# Patient Record
Sex: FEMALE | Race: OTHER | HISPANIC OR LATINO | URBAN - METROPOLITAN AREA
[De-identification: names, ages, dates, MRNs, and addresses within clinical notes are randomized per-mention and may not be internally consistent; named-entity substitution may affect disease eponyms.]

---

## 2019-08-14 ENCOUNTER — EMERGENCY (EMERGENCY)
Facility: HOSPITAL | Age: 39
LOS: 1 days | Discharge: HOME | End: 2019-08-14
Attending: EMERGENCY MEDICINE
Payer: COMMERCIAL

## 2019-08-14 VITALS
TEMPERATURE: 98 F | RESPIRATION RATE: 18 BRPM | DIASTOLIC BLOOD PRESSURE: 68 MMHG | OXYGEN SATURATION: 99 % | HEART RATE: 73 BPM | SYSTOLIC BLOOD PRESSURE: 112 MMHG

## 2019-08-14 VITALS
TEMPERATURE: 98 F | HEART RATE: 78 BPM | HEIGHT: 62 IN | WEIGHT: 179.9 LBS | DIASTOLIC BLOOD PRESSURE: 71 MMHG | SYSTOLIC BLOOD PRESSURE: 119 MMHG | OXYGEN SATURATION: 99 % | RESPIRATION RATE: 18 BRPM

## 2019-08-14 LAB
ALBUMIN SERPL ELPH-MCNC: 3.8 G/DL — SIGNIFICANT CHANGE UP (ref 3.3–5.2)
ALP SERPL-CCNC: 114 U/L — SIGNIFICANT CHANGE UP (ref 40–120)
ALT FLD-CCNC: 16 U/L — SIGNIFICANT CHANGE UP
ANION GAP SERPL CALC-SCNC: 12 MMOL/L — SIGNIFICANT CHANGE UP (ref 5–17)
AST SERPL-CCNC: 15 U/L — SIGNIFICANT CHANGE UP
BASOPHILS # BLD AUTO: 0.03 K/UL — SIGNIFICANT CHANGE UP (ref 0–0.2)
BASOPHILS NFR BLD AUTO: 0.3 % — SIGNIFICANT CHANGE UP (ref 0–2)
BILIRUB SERPL-MCNC: <0.2 MG/DL — LOW (ref 0.4–2)
BUN SERPL-MCNC: 10 MG/DL — SIGNIFICANT CHANGE UP (ref 8–20)
CALCIUM SERPL-MCNC: 9.2 MG/DL — SIGNIFICANT CHANGE UP (ref 8.6–10.2)
CHLORIDE SERPL-SCNC: 105 MMOL/L — SIGNIFICANT CHANGE UP (ref 98–107)
CO2 SERPL-SCNC: 22 MMOL/L — SIGNIFICANT CHANGE UP (ref 22–29)
CREAT SERPL-MCNC: 0.54 MG/DL — SIGNIFICANT CHANGE UP (ref 0.5–1.3)
EOSINOPHIL # BLD AUTO: 0.22 K/UL — SIGNIFICANT CHANGE UP (ref 0–0.5)
EOSINOPHIL NFR BLD AUTO: 2.5 % — SIGNIFICANT CHANGE UP (ref 0–6)
GLUCOSE SERPL-MCNC: 94 MG/DL — SIGNIFICANT CHANGE UP (ref 70–115)
HCG SERPL-ACNC: <4 MIU/ML — SIGNIFICANT CHANGE UP
HCT VFR BLD CALC: 38.3 % — SIGNIFICANT CHANGE UP (ref 34.5–45)
HGB BLD-MCNC: 12.6 G/DL — SIGNIFICANT CHANGE UP (ref 11.5–15.5)
IMM GRANULOCYTES NFR BLD AUTO: 0.3 % — SIGNIFICANT CHANGE UP (ref 0–1.5)
LYMPHOCYTES # BLD AUTO: 3.12 K/UL — SIGNIFICANT CHANGE UP (ref 1–3.3)
LYMPHOCYTES # BLD AUTO: 35.7 % — SIGNIFICANT CHANGE UP (ref 13–44)
MCHC RBC-ENTMCNC: 30.1 PG — SIGNIFICANT CHANGE UP (ref 27–34)
MCHC RBC-ENTMCNC: 32.9 GM/DL — SIGNIFICANT CHANGE UP (ref 32–36)
MCV RBC AUTO: 91.6 FL — SIGNIFICANT CHANGE UP (ref 80–100)
MONOCYTES # BLD AUTO: 0.86 K/UL — SIGNIFICANT CHANGE UP (ref 0–0.9)
MONOCYTES NFR BLD AUTO: 9.9 % — SIGNIFICANT CHANGE UP (ref 2–14)
NEUTROPHILS # BLD AUTO: 4.47 K/UL — SIGNIFICANT CHANGE UP (ref 1.8–7.4)
NEUTROPHILS NFR BLD AUTO: 51.3 % — SIGNIFICANT CHANGE UP (ref 43–77)
PLATELET # BLD AUTO: 355 K/UL — SIGNIFICANT CHANGE UP (ref 150–400)
POTASSIUM SERPL-MCNC: 4 MMOL/L — SIGNIFICANT CHANGE UP (ref 3.5–5.3)
POTASSIUM SERPL-SCNC: 4 MMOL/L — SIGNIFICANT CHANGE UP (ref 3.5–5.3)
PROT SERPL-MCNC: 7 G/DL — SIGNIFICANT CHANGE UP (ref 6.6–8.7)
RBC # BLD: 4.18 M/UL — SIGNIFICANT CHANGE UP (ref 3.8–5.2)
RBC # FLD: 13.5 % — SIGNIFICANT CHANGE UP (ref 10.3–14.5)
SODIUM SERPL-SCNC: 139 MMOL/L — SIGNIFICANT CHANGE UP (ref 135–145)
TROPONIN T SERPL-MCNC: <0.01 NG/ML — SIGNIFICANT CHANGE UP (ref 0–0.06)
WBC # BLD: 8.73 K/UL — SIGNIFICANT CHANGE UP (ref 3.8–10.5)
WBC # FLD AUTO: 8.73 K/UL — SIGNIFICANT CHANGE UP (ref 3.8–10.5)

## 2019-08-14 PROCEDURE — 93010 ELECTROCARDIOGRAM REPORT: CPT

## 2019-08-14 PROCEDURE — 36415 COLL VENOUS BLD VENIPUNCTURE: CPT

## 2019-08-14 PROCEDURE — 71046 X-RAY EXAM CHEST 2 VIEWS: CPT

## 2019-08-14 PROCEDURE — T1013: CPT

## 2019-08-14 PROCEDURE — 71046 X-RAY EXAM CHEST 2 VIEWS: CPT | Mod: 26

## 2019-08-14 PROCEDURE — 99284 EMERGENCY DEPT VISIT MOD MDM: CPT | Mod: 25

## 2019-08-14 PROCEDURE — 80053 COMPREHEN METABOLIC PANEL: CPT

## 2019-08-14 PROCEDURE — 93005 ELECTROCARDIOGRAM TRACING: CPT

## 2019-08-14 PROCEDURE — 84484 ASSAY OF TROPONIN QUANT: CPT

## 2019-08-14 PROCEDURE — 85027 COMPLETE CBC AUTOMATED: CPT

## 2019-08-14 PROCEDURE — 99285 EMERGENCY DEPT VISIT HI MDM: CPT

## 2019-08-14 PROCEDURE — 84702 CHORIONIC GONADOTROPIN TEST: CPT

## 2019-08-14 PROCEDURE — 96374 THER/PROPH/DIAG INJ IV PUSH: CPT

## 2019-08-14 RX ORDER — KETOROLAC TROMETHAMINE 30 MG/ML
15 SYRINGE (ML) INJECTION ONCE
Refills: 0 | Status: DISCONTINUED | OUTPATIENT
Start: 2019-08-14 | End: 2019-08-14

## 2019-08-14 RX ADMIN — Medication 15 MILLIGRAM(S): at 21:43

## 2019-08-14 NOTE — ED STATDOCS - PROGRESS NOTE DETAILS
Pt seen by intake MD and agree with HPI/ROS/PE/Plan. Will reassess. Pt labs wnl/ EKG nsr. Will dc with PCP f/u.

## 2019-08-14 NOTE — ED ADULT NURSE REASSESSMENT NOTE - NS ED NURSE REASSESS COMMENT FT1
pt hemodynamically stable, PIV removed, refer to flowsheet and chart, pt to be discharged, pt understood discharge instructions and plan of care. Pt medically cleared by MD Delcid.

## 2019-08-14 NOTE — ED STATDOCS - CLINICAL SUMMARY MEDICAL DECISION MAKING FREE TEXT BOX
Pt with L chest wall pain underneath breast. Low suspicion for coronary disease, will get blood work, 1 set Troponin, Torodol for pain, XRay.

## 2019-08-14 NOTE — ED ADULT TRIAGE NOTE - CHIEF COMPLAINT QUOTE
Patient states that she is having a burning pain under her left breast since yesterday and pain in her back. Pt denies any injury to her back

## 2019-08-14 NOTE — ED STATDOCS - NS ED ROS FT
Review of Systems  •	CONSTITUTIONAL - no  fever, no diaphoresis, no weight change  •	SKIN - no rash  •	HEMATOLOGIC - no bleeding, no bruising  •	EYES - no eye pain, no blurred vision  •	ENT - no change in hearing, no pain  •	RESPIRATORY - no shortness of breath, no cough  •	CARDIAC - no chest pain, no palpitations  •	GI - no abd pain, no nausea, no vomiting, no diarrhea, no constipation, no bleeding  •	GENITO-URINARY - no discharge, no dysuria; no hematuria,   •	ENDO - no polydypsia, no polyurea, no heat/no cold intolerance  •	MUSCULOSKELETAL - no joint pain, no swelling, no redness, (+) L breast pain  •	NEUROLOGIC - no weakness, no headache, no anesthesia, no paresthesias  •	PSYCH - no anxiety, non suicidal, non homicidal, no hallucination, no depression

## 2019-08-14 NOTE — ED ADULT NURSE NOTE - CHPI ED NUR SYMPTOMS NEG
no shortness of breath/no back pain/no nausea/no fever/no syncope/no dizziness/no chills/no congestion/no diaphoresis/no vomiting

## 2019-08-14 NOTE — ED STATDOCS - ATTENDING CONTRIBUTION TO CARE
I, Hudson Delcid, performed the initial face to face bedside interview with this patient regarding history of present illness, review of symptoms and relevant past medical, social and family history.  I completed an independent physical examination.  I was the initial provider who evaluated this patient. I have signed out the follow up of any pending tests (i.e. labs, radiological studies) to the ACP.  I have communicated the patient’s plan of care and disposition with the ACP.

## 2019-08-14 NOTE — ED STATDOCS - OBJECTIVE STATEMENT
HPI translated by ED , Bruna.  37 y/o F pt with no significant PMHx presents to ED c/o intermittent L chest wall pain (underneath breast) radiating to back onset yesterday. Pt states that the pain began in her breast yesterday, but it began to radiate to back today. Pain began upon sitting at home, worsened with laying down and inspiration, no recent trauma. Self medicated with 2 Ibuprofen. Hx of questionable liver disease. No fevers, chills, cough. No further acute complaints at this time.

## 2020-06-21 NOTE — ED STATDOCS - PHYSICAL EXAMINATION
S/  Taking po well, voiding well, passing flatus    O/  VSS AF           Abdomen with normal tenderness, incision dry and intact    A/  POD 2 LTCS, stable    P/  Routine care, she wants to go home today, see D/C notes and instructions VITAL SIGNS: I have reviewed nursing notes and confirm.  CONSTITUTIONAL: (+) Obese female; in no acute distress.  SKIN: Skin exam is warm and dry, no acute rash.  HEAD: Normocephalic; atraumatic.  EYES: PERRL, EOM intact; conjunctiva and sclera clear.  ENT: No nasal discharge; airway clear. Throat clear.  NECK: Supple; non tender.    CARD: S1, S2 normal; no murmurs, gallops, or rubs. Regular rate and rhythm.  RESP: No wheezes,  no rales or rhonchi. (+) Chest wall tenderness above L breast  ABD:  soft; non-distended; non-tender;   EXT: Normal ROM. No clubbing, cyanosis or edema. (+) Diffuse upper paraspinal pain  NEURO: Alert, oriented. Grossly unremarkable. No focal deficits. no facial droop, moves all extremities  PSYCH: Cooperative, appropriate.

## 2020-09-24 NOTE — ED ADULT TRIAGE NOTE - WEIGHT IN LBS
Patient presents to clinic for physical exam.  Medication Reconciliation: complete    Melissa Santiago, JOHNN     179.8

## 2023-02-07 ENCOUNTER — EMERGENCY (EMERGENCY)
Facility: HOSPITAL | Age: 43
LOS: 1 days | Discharge: HOME | End: 2023-02-07
Attending: EMERGENCY MEDICINE
Payer: COMMERCIAL

## 2023-02-07 VITALS
HEART RATE: 106 BPM | RESPIRATION RATE: 20 BRPM | OXYGEN SATURATION: 96 % | SYSTOLIC BLOOD PRESSURE: 114 MMHG | DIASTOLIC BLOOD PRESSURE: 65 MMHG | HEIGHT: 59 IN | TEMPERATURE: 100 F | WEIGHT: 188.05 LBS

## 2023-02-07 LAB
ALBUMIN SERPL ELPH-MCNC: 4 G/DL — SIGNIFICANT CHANGE UP (ref 3.3–5.2)
ALP SERPL-CCNC: 128 U/L — HIGH (ref 40–120)
ALT FLD-CCNC: 55 U/L — HIGH
ANION GAP SERPL CALC-SCNC: 12 MMOL/L — SIGNIFICANT CHANGE UP (ref 5–17)
APPEARANCE UR: CLEAR — SIGNIFICANT CHANGE UP
AST SERPL-CCNC: 48 U/L — HIGH
BACTERIA # UR AUTO: ABNORMAL
BASOPHILS # BLD AUTO: 0.02 K/UL — SIGNIFICANT CHANGE UP (ref 0–0.2)
BASOPHILS NFR BLD AUTO: 0.1 % — SIGNIFICANT CHANGE UP (ref 0–2)
BILIRUB SERPL-MCNC: <0.2 MG/DL — LOW (ref 0.4–2)
BILIRUB UR-MCNC: NEGATIVE — SIGNIFICANT CHANGE UP
BUN SERPL-MCNC: 8.1 MG/DL — SIGNIFICANT CHANGE UP (ref 8–20)
CALCIUM SERPL-MCNC: 8.4 MG/DL — SIGNIFICANT CHANGE UP (ref 8.4–10.5)
CHLORIDE SERPL-SCNC: 107 MMOL/L — SIGNIFICANT CHANGE UP (ref 96–108)
CO2 SERPL-SCNC: 21 MMOL/L — LOW (ref 22–29)
COLOR SPEC: YELLOW — SIGNIFICANT CHANGE UP
CREAT SERPL-MCNC: 0.58 MG/DL — SIGNIFICANT CHANGE UP (ref 0.5–1.3)
DIFF PNL FLD: ABNORMAL
EGFR: 117 ML/MIN/1.73M2 — SIGNIFICANT CHANGE UP
EOSINOPHIL # BLD AUTO: 0.06 K/UL — SIGNIFICANT CHANGE UP (ref 0–0.5)
EOSINOPHIL NFR BLD AUTO: 0.4 % — SIGNIFICANT CHANGE UP (ref 0–6)
EPI CELLS # UR: SIGNIFICANT CHANGE UP
GLUCOSE SERPL-MCNC: 94 MG/DL — SIGNIFICANT CHANGE UP (ref 70–99)
GLUCOSE UR QL: NEGATIVE MG/DL — SIGNIFICANT CHANGE UP
HCG SERPL-ACNC: <4 MIU/ML — SIGNIFICANT CHANGE UP
HCT VFR BLD CALC: 39.1 % — SIGNIFICANT CHANGE UP (ref 34.5–45)
HGB BLD-MCNC: 13.3 G/DL — SIGNIFICANT CHANGE UP (ref 11.5–15.5)
IMM GRANULOCYTES NFR BLD AUTO: 0.4 % — SIGNIFICANT CHANGE UP (ref 0–0.9)
KETONES UR-MCNC: NEGATIVE — SIGNIFICANT CHANGE UP
LEUKOCYTE ESTERASE UR-ACNC: NEGATIVE — SIGNIFICANT CHANGE UP
LYMPHOCYTES # BLD AUTO: 1.88 K/UL — SIGNIFICANT CHANGE UP (ref 1–3.3)
LYMPHOCYTES # BLD AUTO: 13.6 % — SIGNIFICANT CHANGE UP (ref 13–44)
MCHC RBC-ENTMCNC: 30.1 PG — SIGNIFICANT CHANGE UP (ref 27–34)
MCHC RBC-ENTMCNC: 34 GM/DL — SIGNIFICANT CHANGE UP (ref 32–36)
MCV RBC AUTO: 88.5 FL — SIGNIFICANT CHANGE UP (ref 80–100)
MONOCYTES # BLD AUTO: 0.92 K/UL — HIGH (ref 0–0.9)
MONOCYTES NFR BLD AUTO: 6.6 % — SIGNIFICANT CHANGE UP (ref 2–14)
NEUTROPHILS # BLD AUTO: 10.93 K/UL — HIGH (ref 1.8–7.4)
NEUTROPHILS NFR BLD AUTO: 78.9 % — HIGH (ref 43–77)
NITRITE UR-MCNC: NEGATIVE — SIGNIFICANT CHANGE UP
PH UR: 5 — SIGNIFICANT CHANGE UP (ref 5–8)
PLATELET # BLD AUTO: 324 K/UL — SIGNIFICANT CHANGE UP (ref 150–400)
POTASSIUM SERPL-MCNC: 3.5 MMOL/L — SIGNIFICANT CHANGE UP (ref 3.5–5.3)
POTASSIUM SERPL-SCNC: 3.5 MMOL/L — SIGNIFICANT CHANGE UP (ref 3.5–5.3)
PROT SERPL-MCNC: 7 G/DL — SIGNIFICANT CHANGE UP (ref 6.6–8.7)
PROT UR-MCNC: 30 MG/DL
RAPID RVP RESULT: SIGNIFICANT CHANGE UP
RBC # BLD: 4.42 M/UL — SIGNIFICANT CHANGE UP (ref 3.8–5.2)
RBC # FLD: 14 % — SIGNIFICANT CHANGE UP (ref 10.3–14.5)
RBC CASTS # UR COMP ASSIST: SIGNIFICANT CHANGE UP /HPF (ref 0–4)
SARS-COV-2 RNA SPEC QL NAA+PROBE: SIGNIFICANT CHANGE UP
SODIUM SERPL-SCNC: 139 MMOL/L — SIGNIFICANT CHANGE UP (ref 135–145)
SP GR SPEC: 1.02 — SIGNIFICANT CHANGE UP (ref 1.01–1.02)
UROBILINOGEN FLD QL: NEGATIVE MG/DL — SIGNIFICANT CHANGE UP
WBC # BLD: 13.86 K/UL — HIGH (ref 3.8–10.5)
WBC # FLD AUTO: 13.86 K/UL — HIGH (ref 3.8–10.5)
WBC UR QL: SIGNIFICANT CHANGE UP /HPF (ref 0–5)

## 2023-02-07 PROCEDURE — 99284 EMERGENCY DEPT VISIT MOD MDM: CPT

## 2023-02-07 PROCEDURE — 99284 EMERGENCY DEPT VISIT MOD MDM: CPT | Mod: 25

## 2023-02-07 PROCEDURE — 85025 COMPLETE CBC W/AUTO DIFF WBC: CPT

## 2023-02-07 PROCEDURE — 83690 ASSAY OF LIPASE: CPT

## 2023-02-07 PROCEDURE — 36415 COLL VENOUS BLD VENIPUNCTURE: CPT

## 2023-02-07 PROCEDURE — 87086 URINE CULTURE/COLONY COUNT: CPT

## 2023-02-07 PROCEDURE — 87040 BLOOD CULTURE FOR BACTERIA: CPT

## 2023-02-07 PROCEDURE — 81001 URINALYSIS AUTO W/SCOPE: CPT

## 2023-02-07 PROCEDURE — 74177 CT ABD & PELVIS W/CONTRAST: CPT | Mod: 26,MA

## 2023-02-07 PROCEDURE — 0225U NFCT DS DNA&RNA 21 SARSCOV2: CPT

## 2023-02-07 PROCEDURE — 84702 CHORIONIC GONADOTROPIN TEST: CPT

## 2023-02-07 PROCEDURE — 80053 COMPREHEN METABOLIC PANEL: CPT

## 2023-02-07 PROCEDURE — 74177 CT ABD & PELVIS W/CONTRAST: CPT | Mod: MA

## 2023-02-07 PROCEDURE — 96360 HYDRATION IV INFUSION INIT: CPT | Mod: XU

## 2023-02-07 RX ORDER — IBUPROFEN 200 MG
600 TABLET ORAL ONCE
Refills: 0 | Status: COMPLETED | OUTPATIENT
Start: 2023-02-07 | End: 2023-02-07

## 2023-02-07 RX ORDER — SODIUM CHLORIDE 9 MG/ML
1000 INJECTION INTRAMUSCULAR; INTRAVENOUS; SUBCUTANEOUS ONCE
Refills: 0 | Status: COMPLETED | OUTPATIENT
Start: 2023-02-07 | End: 2023-02-07

## 2023-02-07 RX ORDER — ACETAMINOPHEN 500 MG
975 TABLET ORAL ONCE
Refills: 0 | Status: COMPLETED | OUTPATIENT
Start: 2023-02-07 | End: 2023-02-07

## 2023-02-07 RX ADMIN — Medication 975 MILLIGRAM(S): at 12:44

## 2023-02-07 RX ADMIN — Medication 600 MILLIGRAM(S): at 12:45

## 2023-02-07 RX ADMIN — SODIUM CHLORIDE 1000 MILLILITER(S): 9 INJECTION INTRAMUSCULAR; INTRAVENOUS; SUBCUTANEOUS at 12:45

## 2023-02-07 NOTE — ED STATDOCS - NSFOLLOWUPINSTRUCTIONS_ED_ALL_ED_FT
Dolor abdominal en los adultos    Abdominal Pain, Adult      El dolor en el abdomen (dolor abdominal) puede tener muchas causas. A menudo, el dolor abdominal no es grave y mejora sin tratamiento o con tratamiento en la casa. Sin embargo, a veces el dolor abdominal es grave.    El médico le hará preguntas sobre magda antecedentes médicos y le hará un examen físico para tratar de determinar la causa del dolor abdominal.      Siga estas instrucciones en barba casa:     Medicamentos     •Use los medicamentos de venta melisa y los recetados solamente zenaida se lo haya indicado el médico.      • No tome un laxante a menos que se lo haya indicado el médico.      Instrucciones generales     •Controle barba afección para detectar cualquier cambio.      •Mesha suficiente líquido zenaida para mantener la orina de color amarillo pálido.      •Concurra a todas las visitas de seguimiento zenaida se lo haya indicado el médico. [place] es importante.        Comuníquese con un médico si:    •El dolor abdominal cambia o empeora.      •No tiene apetito o baja de peso sin proponérselo.      •Está estreñido o tiene diarrea peter más de 2 o 3 días.      •Tiene dolor cuando orina o defeca.      •El dolor abdominal lo despierta de noche.      •El dolor empeora con las comidas, después de comer o con determinados alimentos.      •Tiene vómitos y no puede retener nada de lo que ingiere.      •Tiene fiebre.      •Observa joaquin en la orina.        Solicite ayuda inmediatamente si:    •El dolor no desaparece tan pronto zenaida el médico le dijo que era esperable.      •No puede dejar de vomitar.      •El dolor se siente solo en zonas del abdomen, zenaida el lado derecho o la parte inferior izquierda del abdomen. Si se localiza en la valencia derecha, posiblemente podría tratarse de apendicitis.      •Las heces son sanguinolentas o de color shabana, o de aspecto alquitranado.      •Tiene dolor intenso, cólicos o distensión abdominal.    •Tiene signos de deshidratación, zenaida los siguientes:  •Orina de color oscuro, muy escasa o falta de orina.      •Labios agrietados.      •Sequedad de boca.      •Ojos hundidos.      •Somnolencia.      •Debilidad.        •Tiene dificultad para respirar o dolor en el pecho.        Resumen    •A menudo, el dolor abdominal no es grave y mejora sin tratamiento o con tratamiento en la casa. Sin embargo, a veces el dolor abdominal es grave.      •Controle barba afección para detectar cualquier cambio.      •Use los medicamentos de venta melisa y los recetados solamente zenaida se lo haya indicado el médico.      •Comuníquese con un médico si el dolor abdominal cambia o empeora.      •Busque ayuda de inmediato si tiene dolor intenso, cólicos o distensión abdominal.      Esta información no tiene zenaida fin reemplazar el consejo del médico. Asegúrese de hacerle al médico cualquier pregunta que tenga.

## 2023-02-07 NOTE — ED ADULT NURSE NOTE - OBJECTIVE STATEMENT
PT reports pain since Saturday associated with fevers and diarrhea. Pain now radiates to back and right lower quadrant. Pt took ibuprofen with out relief.

## 2023-02-07 NOTE — ED STATDOCS - NS ED ATTENDING STATEMENT MOD
This was a shared visit with the CONOR. I reviewed and verified the documentation and independently performed the documented:

## 2023-02-07 NOTE — ED STATDOCS - PROGRESS NOTE DETAILS
Shlomo Rondon PA-C: Pt discussed at length with attending HPI/ROS/PE confirmed, Pt seen resting comfortable in no acute distress in chair.

## 2023-02-07 NOTE — ED STATDOCS - PATIENT PORTAL LINK FT
You can access the FollowMyHealth Patient Portal offered by [facility] by registering at the following website: [email or web address]. By joining [employer]’s FollowMyHealth portal, you will also be able to view your health information using other applications (apps) compatible with our system.

## 2023-02-07 NOTE — ED STATDOCS - OBJECTIVE STATEMENT
43 y/o female with no PMHx s/p appendectomy, and  presents to ED c/o abdominal pain. Patient reports 4 days of abdominal pain with associated diarrhea, chills and subjective fever. Reports now the pain is radiating around to the back bilaterally x3 days.     Denies N/V, urinary symptoms  : Krissy

## 2023-02-07 NOTE — ED ADULT NURSE NOTE - CHIEF COMPLAINT QUOTE
Pt A&OX4, NAD. Pt presents to the ED with complaints of abdominal pian and diarrhea x4 days. Breathing even and unlabored.

## 2023-02-07 NOTE — ED STATDOCS - CLINICAL SUMMARY MEDICAL DECISION MAKING FREE TEXT BOX
evaluate for source of fever; Viral swab, labs, UA, and re-assess. evaluate for source of fever; Viral swab, labs, UA, and re-assess.      Shlomo Rondon PA-C: PT with stable VS, no acute distress, non toxic appearing, tolerating PO in the ED, Pt with no acute findings requiring intervention at this time, Pt cleared for dc home with supportive care, follow up to PCP, educated about when to return to the ED if needed. PT verbalizes that he understands all instructions and results. Pt informed that ED is open and available 24/7 365 days a yr, encouraged to return to the ED if they have any change in condition, or feel the need for revaluation.    utilized to obtain History, ROS, Physical Exam, explanations of results and plan of care, as well as follow up instructions.

## 2023-02-07 NOTE — ED STATDOCS - ADDITIONAL NOTES AND INSTRUCTIONS:
PT was evaluated At [facility] ED and was found to have a condition that warranted time of to rest and heal from WORK/SCHOOL.   Shlomo Rondon PA-C

## 2023-02-07 NOTE — ED STATDOCS - ATTENDING APP SHARED VISIT CONTRIBUTION OF CARE
This was a shared visit with CONOR. I reviewed and verified the documentation and independently performed the documented history/exam/mdm.

## 2023-02-08 LAB
CULTURE RESULTS: SIGNIFICANT CHANGE UP
SPECIMEN SOURCE: SIGNIFICANT CHANGE UP

## 2023-02-12 LAB
CULTURE RESULTS: SIGNIFICANT CHANGE UP
CULTURE RESULTS: SIGNIFICANT CHANGE UP
SPECIMEN SOURCE: SIGNIFICANT CHANGE UP
SPECIMEN SOURCE: SIGNIFICANT CHANGE UP

## 2024-08-15 ENCOUNTER — EMERGENCY (EMERGENCY)
Facility: HOSPITAL | Age: 44
LOS: 1 days | Discharge: HOME | End: 2024-08-15
Attending: EMERGENCY MEDICINE
Payer: COMMERCIAL

## 2024-08-15 VITALS
SYSTOLIC BLOOD PRESSURE: 123 MMHG | TEMPERATURE: 99 F | OXYGEN SATURATION: 99 % | DIASTOLIC BLOOD PRESSURE: 81 MMHG | RESPIRATION RATE: 17 BRPM | HEART RATE: 92 BPM

## 2024-08-15 PROCEDURE — 99284 EMERGENCY DEPT VISIT MOD MDM: CPT

## 2024-08-15 PROCEDURE — T1013: CPT

## 2024-08-15 PROCEDURE — 99283 EMERGENCY DEPT VISIT LOW MDM: CPT

## 2024-08-15 RX ORDER — IBUPROFEN 200 MG
600 TABLET ORAL ONCE
Refills: 0 | Status: COMPLETED | OUTPATIENT
Start: 2024-08-15 | End: 2024-08-15

## 2024-08-15 RX ADMIN — Medication 1 TABLET(S): at 22:18

## 2024-08-15 RX ADMIN — Medication 600 MILLIGRAM(S): at 22:18

## 2024-08-15 NOTE — ED PROVIDER NOTE - SKIN, MLM
Skin normal color for race, warm, dry and intact. small area (1 cm) induration surrounded by erythematous area in the right upper back. No weeping or drainage appreciated.

## 2024-08-15 NOTE — ED PROVIDER NOTE - OBJECTIVE STATEMENT
Radha Pike is a 43 y Female patient presenting back pain secondary to what appears like an insect bite. Patient first noticed the back pain 2 weeks prior but was able to ignore it until 2 days prior. Since then, the pain has gotten worse.  It is located on her Right upper back. She describes the pain as pulsating and is 8/10. Patient denies taking pain medication. She denies travel history, trauma, sick contacts and known insect bites. Patient confirms chills but otherwise denies fever, nausea, vomiting, body aches, chest pain/ palpitations, shortness of breath, numbness, tingling and weakness.

## 2024-08-15 NOTE — ED PROVIDER NOTE - MUSCULOSKELETAL, MLM
Spine appears normal, range of motion is not limited, no muscle or joint tenderness, tenderness to palpation of Right upper back

## 2024-08-15 NOTE — ED PROVIDER NOTE - NSFOLLOWUPINSTRUCTIONS_ED_ALL_ED_FT
=======================  Celulitis, en adultos  Cellulitis, Adult  A person's legs and feet. One leg is normal and the other leg is affected by cellulitis.  La celulitis es deneen infección de la piel. La marcelina infectada generalmente está caliente, de color correia, hinchada y duele. Generalmente ocurre en la parte inferior del cuerpo, zenaida las piernas, los pies y los dedos de los pies, mely esta afección puede ocurrir en cualquier parte del cuerpo. La infección puede propagarse a los músculos, la joaquin y el tejido subyacente, y tornarse potencialmente mortal sin tratamiento. Es importante obtener tratamiento de inmediato para esta afección.    ¿Cuáles son las causas?  La celulitis es causada por bacterias. Las bacterias ingresan a través de deneen lesión cutánea, por ejemplo, un lexi, deneen quemadura, deneen picadura de insecto o mordedura de animal, deneen llaga abierta o deneen grieta.    ¿Qué incrementa el riesgo?  Es más probable que esta afección se manifieste en personas que:  Tienen debilitado el sistema de defensa del cuerpo (sistema inmunitario).  Tienen más de 60 años.  Tienen diabetes.  Tienen un tipo de enfermedad renal o de enfermedad hepática (cirrosis) de larga duración (crónica).  Tienen obesidad.  Tienen deneen afección en la piel, por ejemplo:  Deneen erupción cutánea con picazón, zenaida eczema o psoriasis.  Deneen erupción por hongos en los pies o en los pliegues de la piel.  Erupciones cutáneas con ampollas, zenaida culebrilla o varicela.  Movimiento lento de la joaquin en las venas (estasis venosa).  Acumulación de líquido debajo de la piel (edema).  Tienen heridas abiertas en la piel, zenaida rios, heridas por punción, quemaduras, picaduras, rasguños, tatuajes, piercings o heridas de deneen cirugía.  Parisi recibido radioterapia.  Consumen drogas por vía intravenosa.  ¿Cuáles son los signos o síntomas?  Los síntomas de esta afección incluyen:  Piel con un aspecto de color correia o fabián, o ligeramente más oscuro que el color habitual de la piel.  Líneas o manchas en la piel.  Marcelina de la piel hinchada.  Dolor o sensibilidad al tacto en deneen marcelina de la piel.  Calor en la piel.  Fiebre o escalofríos.  Ampollas.  Cansancio (fatiga).  ¿Cómo se diagnostica?  Esta afección se diagnostica en función de los antecedentes médicos y un examen físico. También pueden hacerle pruebas, que incluyen las siguientes:  Análisis de joaquin.  Pruebas de diagnóstico por imágenes.  Pruebas en deneen muestra de líquido extraído de la herida (cultivo de la herida).  ¿Cómo se trata?  El tratamiento de esta afección puede incluir lo siguiente:  Medicamentos. Estos pueden incluir antibióticos o medicamentos para tratar las alergias (antihistamínicos).  [place].  Aplicación de paños fríos o tibios (compresas) sobre la piel.  Si la afección es más grave, es posible que deba permanecer en el hospital y recibir antibióticos por vía intravenosa.  Por lo general, la infección comienza a mejorar en 1 o 2 días de tratamiento.    Siga estas instrucciones en barba casa:  Medicamentos    Use los medicamentos de venta melisa y los recetados solamente zenaida se lo haya indicado el médico.  Si le recetaron antibióticos, tómelos zenaida se lo haya indicado el médico. No deje de usar el antibiótico aunque comience a sentirse mejor.  Instrucciones generales    Mesha suficiente líquido para mantener el pis (orina) de color amarillo pálido.  No toque ni frote la marcelina infectada.  Cuando esté sentado o acostado, levante (eleve) la marcelina infectada por encima del nivel del corazón.  Retome magda actividades normales zenaida se lo haya indicado el médico. Pregúntele al médico qué actividades son seguras para usted.  Aplique compresas frías o tibias en la marcelina afectada zenaida se lo haya indicado el médico.  Concurra a todas las visitas de seguimiento. El médico necesitará asegurarse de que no se esté desarrollando deneen infección más grave.  Comuníquese con un médico si:  Tiene fiebre.  Los síntomas no mejoran en el término de 1 o 2 días del comienzo del tratamiento o usted experimenta nuevos síntomas.  El hueso o la articulación que se encuentran por debajo de la marcelina infectada le duelen después de que la piel se nava.  La infección se repite en la misma marcelina o en deneen marcelina diferente. Algunos signos de esto podrían ser los siguientes, entre otros:  Tiene deneen protuberancia inflamada en la marcelina infectada.  La marcelina de color correia se extiende, se torna de color oscuro o duele más.  La secreción aumenta.  Hay pus o mal olor en la marcelina infectada.  Siente más dolor.  Se siente enfermo y tiene ana musculares y debilidad.  Tiene vómitos o diarrea que no desaparecen.  Solicite ayuda de inmediato si:  Observa deneen línea payton en la piel que sale desde la marcelina infectada.  Nota que la piel se torna de color fabián o shabana y se .  [place] síntoma puede indicar deneen emergencia. Solicite ayuda de inmediato. Llame al 911.  No espere a chloe si el síntoma desaparece.  No conduzca por magda propios medios hasta el hospital. [place] Augmentin 875 mg dos veces al día peter 7 días.     Noe un seguimiento con barba médico de atención primaria dentro de los siete días para analizar barba visita aquí hoy.     Regrese al departamento de emergencias si presenta cualquier síntoma nuevo o preocupante, incluidos, entre otros, fiebre, escalofríos, debilidad, entumecimiento, confusión, dolor en el pecho, dificultad para respirar, dolor abdominal, náuseas, vómitos y diarrea.  =======================  Celulitis, en adultos  Cellulitis, Adult  A person's legs and feet. One leg is normal and the other leg is affected by cellulitis.  La celulitis es deneen infección de la piel. La marcelina infectada generalmente está caliente, de color correia, hinchada y duele. Generalmente ocurre en la parte inferior del cuerpo, zenaida las piernas, los pies y los dedos de los pies, mely esta afección puede ocurrir en cualquier parte del cuerpo. La infección puede propagarse a los músculos, la joaquin y el tejido subyacente, y tornarse potencialmente mortal sin tratamiento. Es importante obtener tratamiento de inmediato para esta afección.    ¿Cuáles son las causas?  La celulitis es causada por bacterias. Las bacterias ingresan a través de deneen lesión cutánea, por ejemplo, un lexi, deneen quemadura, deneen picadura de insecto o mordedura de animal, deneen llaga abierta o deneen grieta.    ¿Qué incrementa el riesgo?  Es más probable que esta afección se manifieste en personas que:  Tienen debilitado el sistema de defensa del cuerpo (sistema inmunitario).  Tienen más de 60 años.  Tienen diabetes.  Tienen un tipo de enfermedad renal o de enfermedad hepática (cirrosis) de larga duración (crónica).  Tienen obesidad.  Tienen deneen afección en la piel, por ejemplo:  Deneen erupción cutánea con picazón, zenaida eczema o psoriasis.  Deneen erupción por hongos en los pies o en los pliegues de la piel.  Erupciones cutáneas con ampollas, zenaida culebrilla o varicela.  Movimiento lento de la joaquin en las venas (estasis venosa).  Acumulación de líquido debajo de la piel (edema).  Tienen heridas abiertas en la piel, zenaida rios, heridas por punción, quemaduras, picaduras, rasguños, tatuajes, piercings o heridas de deneen cirugía.  Parisi recibido radioterapia.  Consumen drogas por vía intravenosa.  ¿Cuáles son los signos o síntomas?  Los síntomas de esta afección incluyen:  Piel con un aspecto de color correia o fabián, o ligeramente más oscuro que el color habitual de la piel.  Líneas o manchas en la piel.  Marcelina de la piel hinchada.  Dolor o sensibilidad al tacto en deneen marcelina de la piel.  Calor en la piel.  Fiebre o escalofríos.  [place].  Cansancio (fatiga).  ¿Cómo se diagnostica?  Esta afección se diagnostica en función de los antecedentes médicos y un examen físico. También pueden hacerle pruebas, que incluyen las siguientes:  Análisis de joaquin.  Pruebas de diagnóstico por imágenes.  Pruebas en deneen muestra de líquido extraído de la herida (cultivo de la herida).  ¿Cómo se trata?  El tratamiento de esta afección puede incluir lo siguiente:  Medicamentos. Estos pueden incluir antibióticos o medicamentos para tratar las alergias (antihistamínicos).  [place].  Aplicación de paños fríos o tibios (compresas) sobre la piel.  Si la afección es más grave, es posible que deba permanecer en el hospital y recibir antibióticos por vía intravenosa.  Por lo general, la infección comienza a mejorar en 1 o 2 días de tratamiento.    Siga estas instrucciones en barba casa:  Medicamentos    Use los medicamentos de venta melisa y los recetados solamente zenaida se lo haya indicado el médico.  Si le recetaron antibióticos, tómelos zenaida se lo haya indicado el médico. No deje de usar el antibiótico aunque comience a sentirse mejor.  Instrucciones generales    Mesha suficiente líquido para mantener el pis (orina) de color amarillo pálido.  No toque ni frote la marcelina infectada.  Cuando esté sentado o acostado, levante (eleve) la marcelina infectada por encima del nivel del corazón.  Retome magda actividades normales zenaida se lo haya indicado el médico. Pregúntele al médico qué actividades son seguras para usted.  Aplique compresas frías o tibias en la marcelina afectada zenaida se lo haya indicado el médico.  Concurra a todas las visitas de seguimiento. El médico necesitará asegurarse de que no se esté desarrollando deneen infección más grave.  Comuníquese con un médico si:  Tiene fiebre.  Los síntomas no mejoran en el término de 1 o 2 días del comienzo del tratamiento o usted experimenta nuevos síntomas.  El hueso o la articulación que se encuentran por debajo de la marcelina infectada le duelen después de que la piel se nava.  La infección se repite en la misma marcelina o en deneen marcelina diferente. Algunos signos de esto podrían ser los siguientes, entre otros:  Tiene deneen protuberancia inflamada en la marcelina infectada.  La marcelina de color correia se extiende, se torna de color oscuro o duele más.  La secreción aumenta.  Hay pus o mal olor en la marcelina infectada.  Siente más dolor.  Se siente enfermo y tiene ana musculares y debilidad.  Tiene vómitos o diarrea que no desaparecen.  Solicite ayuda de inmediato si:  Observa deneen línea payton en la piel que sale desde la marcelina infectada.  Nota que la piel se torna de color fabián o shabana y se .  [place] síntoma puede indicar deneen emergencia. Solicite ayuda de inmediato. Llame al 911.  No espere a chloe si el síntoma desaparece.  No conduzca por magda propios medios hasta el hospital.

## 2024-08-15 NOTE — ED PROVIDER NOTE - CLINICAL SUMMARY MEDICAL DECISION MAKING FREE TEXT BOX
43 y Female patient presenting back pain secondary to what appears like an insect bite. PE was remarkable for an area of induration (1 cm) surrounded by a warm, erythematous nodule and tenderness to palpation. No discharge was appreciated. Given the lack trauma, sick contacts, travel history, patient's complaint was determined to be an insect bite in etiology, but showing early signs of possible cellulitis. Therefore, pain medications and 1 dose of amoxicillin was administered. Patient was afebrile and stable throughout the interaction and discharged with antibiotics and instructions.

## 2024-08-15 NOTE — ED PROVIDER NOTE - PATIENT PORTAL LINK FT
You can access the FollowMyHealth Patient Portal offered by [facility] by registering at the following website: [email or web address]. By joining [employer]’s FollowMyHealth portal, you will also be able to view your health information using other applications (apps) compatible with our system.

## 2024-08-15 NOTE — ED PROVIDER NOTE - NS ED ATTENDING STATEMENT MOD
This was a shared visit with the CONOR. I reviewed and verified the documentation. I have seen and examined this patient and fully participated in the care of this patient as the teaching attending.  The service was shared with the CONOR.  I reviewed and verified the documentation. Attending with

## 2024-08-16 NOTE — ED ADULT NURSE NOTE - OBJECTIVE STATEMENT
telephonic  ID: [id number]  Patient AOx4  presents to ED c/o lump to right upper back x 2 weeks associated with chills. Patient denies fever, chest pain, nausea, vomiting, shortness of breath, Patient states it has been gradually increasing in size. NAD.

## 2025-02-12 NOTE — ED ADULT TRIAGE NOTE - HEART RATE (BEATS/MIN)
Orders:    POCT glycosylated hemoglobin (Hb A1C)    insulin aspart (NOVOLOG) 100 UNIT/ML injection vial; INJECT 12 UNITS THREE TIMES DAILY BEFORE MEAL(S)    insulin glargine (LANTUS SOLOSTAR) 100 UNIT/ML injection pen; INJECT 30 UNITS SUBCUTANEOUSLY NIGHTLY          Orders:    amLODIPine (NORVASC) 5 MG tablet; Take 1 tablet by mouth daily    clopidogrel (PLAVIX) 75 MG tablet; Take 1 tablet by mouth daily    rosuvastatin (CRESTOR) 40 MG tablet; TAKE ONE TABLET BY MOUTH EVERY EVENING    metoprolol succinate (TOPROL XL) 50 MG extended release tablet; Take 1 tablet by mouth daily          Orders:    lisinopril (PRINIVIL;ZESTRIL) 20 MG tablet; Take 1 tablet by mouth daily     106 no